# Patient Record
Sex: FEMALE | Race: WHITE | Employment: STUDENT | URBAN - METROPOLITAN AREA
[De-identification: names, ages, dates, MRNs, and addresses within clinical notes are randomized per-mention and may not be internally consistent; named-entity substitution may affect disease eponyms.]

---

## 2022-08-17 ENCOUNTER — OFFICE VISIT (OUTPATIENT)
Dept: FAMILY MEDICINE CLINIC | Age: 22
End: 2022-08-17
Payer: COMMERCIAL

## 2022-08-17 VITALS
WEIGHT: 148 LBS | HEIGHT: 67 IN | BODY MASS INDEX: 23.23 KG/M2 | DIASTOLIC BLOOD PRESSURE: 62 MMHG | OXYGEN SATURATION: 99 % | TEMPERATURE: 97.1 F | SYSTOLIC BLOOD PRESSURE: 102 MMHG | HEART RATE: 62 BPM

## 2022-08-17 DIAGNOSIS — N94.6 DYSMENORRHEA: ICD-10-CM

## 2022-08-17 DIAGNOSIS — J30.9 CHRONIC ALLERGIC RHINITIS: Primary | ICD-10-CM

## 2022-08-17 DIAGNOSIS — J45.30 MILD PERSISTENT ALLERGIC ASTHMA: ICD-10-CM

## 2022-08-17 PROCEDURE — 99203 OFFICE O/P NEW LOW 30 MIN: CPT | Performed by: FAMILY MEDICINE

## 2022-08-17 RX ORDER — OLOPATADINE HYDROCHLORIDE 1 MG/ML
1 SOLUTION/ DROPS OPHTHALMIC 2 TIMES DAILY
COMMUNITY

## 2022-08-17 RX ORDER — MONTELUKAST SODIUM 10 MG/1
10 TABLET ORAL DAILY
Qty: 90 TABLET | Refills: 1 | Status: SHIPPED | OUTPATIENT
Start: 2022-08-17

## 2022-08-17 RX ORDER — FLUTICASONE PROPIONATE 44 UG/1
1 AEROSOL, METERED RESPIRATORY (INHALATION) 2 TIMES DAILY
COMMUNITY
End: 2022-08-17 | Stop reason: SDUPTHER

## 2022-08-17 RX ORDER — FLUTICASONE PROPIONATE 44 UG/1
1 AEROSOL, METERED RESPIRATORY (INHALATION) 2 TIMES DAILY
Qty: 10.6 G | Refills: 2 | Status: SHIPPED | OUTPATIENT
Start: 2022-08-17

## 2022-08-17 RX ORDER — LEVALBUTEROL TARTRATE 45 UG/1
1 AEROSOL, METERED ORAL EVERY 4 HOURS PRN
Qty: 15 G | Refills: 3 | Status: SHIPPED | OUTPATIENT
Start: 2022-08-17 | End: 2022-08-19 | Stop reason: SDUPTHER

## 2022-08-17 SDOH — ECONOMIC STABILITY: FOOD INSECURITY: WITHIN THE PAST 12 MONTHS, YOU WORRIED THAT YOUR FOOD WOULD RUN OUT BEFORE YOU GOT MONEY TO BUY MORE.: NEVER TRUE

## 2022-08-17 SDOH — ECONOMIC STABILITY: FOOD INSECURITY: WITHIN THE PAST 12 MONTHS, THE FOOD YOU BOUGHT JUST DIDN'T LAST AND YOU DIDN'T HAVE MONEY TO GET MORE.: NEVER TRUE

## 2022-08-17 ASSESSMENT — PATIENT HEALTH QUESTIONNAIRE - PHQ9
2. FEELING DOWN, DEPRESSED OR HOPELESS: 0
SUM OF ALL RESPONSES TO PHQ QUESTIONS 1-9: 0
1. LITTLE INTEREST OR PLEASURE IN DOING THINGS: 0
SUM OF ALL RESPONSES TO PHQ9 QUESTIONS 1 & 2: 0
SUM OF ALL RESPONSES TO PHQ QUESTIONS 1-9: 0

## 2022-08-17 ASSESSMENT — SOCIAL DETERMINANTS OF HEALTH (SDOH): HOW HARD IS IT FOR YOU TO PAY FOR THE VERY BASICS LIKE FOOD, HOUSING, MEDICAL CARE, AND HEATING?: NOT HARD AT ALL

## 2022-08-17 NOTE — PROGRESS NOTES
Portions of this chart may have been created with voice recognition software. Occasional wrong-word or \"sound-alike\" substitutions may have occurred due to the inherent limitations of voice recognition software. Read the chart carefully and recognize, using context, where these substitutions have occurred        Chief Complaint     New Patient (Est. Severe menstrual cramps, referral to allergy)               Nidia Cruz is a 25 y.o. female here for establishing care with me. 1. Chronic allergic rhinitis    2. Mild persistent allergic asthma    Patient has a history of chronic allergic rhinitis, allergic asthma, was under the care of pediatric allergist however has not followed up in the long time. She has been on Flovent in the past which used to control her symptoms better. She requests refill for the same. She has had allergy shots in the past as well but over the years she had stopped taking the allergy shots. She is also taking Singulair just on an intermittent basis now especially in the summer. Also uses Pataday as well. She also used to take albuterol in the past but leading to palpitations it was switched to Xopenex and that she takes as needed. We will refill all the medications for now. Patient will hold off on allergy referral for now and we will refer if needed. No current shortness of breath wheezing excessive cough allergy symptoms at this time. 3. Dysmenorrhea  Patient states that her menstrual cycles are very painful and she has tried over-the-counter supplements with no help. She has not tried naproxen recommended to take 1 to 2 capsules of Aleve twice daily as needed for her menstrual cramps symptoms. Additional supplements discussed. Patient does not want to be on any hormonal treatment for this. Menstrual cycles are not very heavy. They are regular however. No other symptoms associated with this.             REVIEW OF SYSTEMS:  CONSTITUTIONAL: No weight loss, fever, weakness or fatigue. HEENT:No sore throat, runny nose, earache. No vision or hearing disturbances   CARDIOVASCULAR: No chest pain,No palpitations or edema. RESPIRATORY : No shortness of breath, cough. GASTROINTESTINAL: No nausea, vomiting, diarrhea or constipation. No abdominal pain. GENITOURINARY:No dysuria, urgency, frequency, hematuria. NEUROLOGICAL: No headache, dizziness or syncope. MUSCULOSKELETAL: No muscle, back pain, joint pain or stiffness. PSYCHIATRIC : No mood changes  SKIN: No rash or itching. No results found for: WBC, HGB, HCT, MCV, PLT   No results found for: LABA1C  No results found for: EAG  No results found for: TSHFT4, TSH  No results found for: CHOL  No results found for: TRIG  No results found for: HDL  No results found for: LDLCHOLESTEROL, LDLCALC  No results found for: LABVLDL, VLDL  No results found for: CHOLHDLRATIO   No results found for: NA, K, CL, CO2, BUN, CREATININE, GLUCOSE, CALCIUM, PROT, LABALBU, BILITOT, ALKPHOS, AST, ALT, LABGLOM, GFRAA, AGRATIO, GLOB        Current Outpatient Medications   Medication Sig Dispense Refill    Fexofenadine HCl (ALLEGRA PO) Take by mouth      fluticasone (FLOVENT HFA) 44 MCG/ACT inhaler Inhale 1 puff into the lungs 2 times daily 10.6 g 2    levalbuterol (XOPENEX HFA) 45 MCG/ACT inhaler Inhale 1 puff into the lungs every 4 hours as needed for Wheezing 15 g 3    montelukast (SINGULAIR) 10 MG tablet Take 1 tablet by mouth daily 90 tablet 1    olopatadine (PATANOL) 0.1 % ophthalmic solution 1 drop 2 times daily       No current facility-administered medications for this visit. No Known Allergies      History reviewed. No pertinent past medical history. History reviewed. No pertinent surgical history. History reviewed. No pertinent family history.      Social History     Socioeconomic History    Marital status: Single     Spouse name: None    Number of children: None    Years of education: None    Highest education level: None   Tobacco Use    Smoking status: Never    Smokeless tobacco: Never     Social Determinants of Health     Financial Resource Strain: Low Risk     Difficulty of Paying Living Expenses: Not hard at all   Food Insecurity: No Food Insecurity    Worried About [address] in the Last Year: Never true    [facility] in the Last Year: Never true          OBJECTIVE:      Vitals:    08/17/22 1331   BP: 102/62   Pulse: 62   Temp: 97.1 °F (36.2 °C)   SpO2: 99%   Weight: 148 lb (67.1 kg)   Height: 5' 7\" (1.702 m)         Constitutional: Patient appears well-nourished, not in any distress. HENT:  Head: Normocephalic and atraumatic. Eyes: Conjunctivae and EOM are normal  Right Ear: External ear normal.  Left Ear: External ear normal.   Nose: Nose normal.  Mouth/Throat: Oropharynx is clear and moist.   Neck: Normal range of motion. Neck supple. Lymphatic: No cervical lymphadenopathy  Cardiovascular: Normal rate, regular rhythm, normal heart sounds and intact distal pulses. Pulmonary/Chest: Effort normal and breath sounds normal, no wheezes or rhonchi. Neurological:Patient is alert, oriented to person, place, and time. No obvious focal neurological deficits  Skin: Skin is warm and moist.  Psychiatric:Patient has a normal mood and affect, behavior is normal. Judgment and thought content normal.    ASSESSMENT AND PLAN    Rubén Goodson was seen today for new patient. Diagnoses and all orders for this visit:    Chronic allergic rhinitis    Mild persistent allergic asthma    Dysmenorrhea    Other orders  -     fluticasone (FLOVENT HFA) 44 MCG/ACT inhaler; Inhale 1 puff into the lungs 2 times daily  -     levalbuterol (XOPENEX HFA) 45 MCG/ACT inhaler; Inhale 1 puff into the lungs every 4 hours as needed for Wheezing  -     montelukast (SINGULAIR) 10 MG tablet;  Take 1 tablet by mouth daily           DISCHARGE MEDICATION LIST        Medication List            Accurate as of August 17, 2022  1:53 PM. If you have any questions, ask your nurse or doctor. START taking these medications      levalbuterol 45 MCG/ACT inhaler  Commonly known as: Xopenex HFA  Inhale 1 puff into the lungs every 4 hours as needed for Wheezing  Started by: Naa Robbins MD     montelukast 10 MG tablet  Commonly known as: SINGULAIR  Take 1 tablet by mouth daily  Started by: Naa Robbins MD            CONTINUE taking these medications      ALLEGRA PO     fluticasone 44 MCG/ACT inhaler  Commonly known as: FLOVENT HFA  Inhale 1 puff into the lungs 2 times daily     olopatadine 0.1 % ophthalmic solution  Commonly known as: PATANOL               Where to Get Your Medications        These medications were sent to [address] [phone] - f [phone]  [place] [address]      Phone: [phone]   fluticasone 44 MCG/ACT inhaler  levalbuterol 45 MCG/ACT inhaler  montelukast 10 MG tablet          Return in about 6 months (around 2/17/2023), or if symptoms worsen or fail to improve. Please refer to diagnosis, orders and patient instructions for further recommendations given. All patient's questions and concerns were addressed appropriately. All orders, handouts were reviewed in detail with the patient and patient voiced understanding verbally.

## 2022-08-19 RX ORDER — LEVALBUTEROL TARTRATE 45 UG/1
1 AEROSOL, METERED ORAL EVERY 4 HOURS PRN
Qty: 15 G | Refills: 3 | Status: SHIPPED | OUTPATIENT
Start: 2022-08-19 | End: 2023-08-19

## 2022-12-30 RX ORDER — MONTELUKAST SODIUM 10 MG/1
10 TABLET ORAL DAILY
Qty: 180 TABLET | Refills: 0 | Status: SHIPPED | OUTPATIENT
Start: 2022-12-30 | End: 2023-06-28

## 2022-12-30 RX ORDER — FLUTICASONE PROPIONATE 44 UG/1
1 AEROSOL, METERED RESPIRATORY (INHALATION) 2 TIMES DAILY
Qty: 6 EACH | Refills: 2 | Status: SHIPPED | OUTPATIENT
Start: 2022-12-30

## 2022-12-30 RX ORDER — LEVALBUTEROL TARTRATE 45 UG/1
1 AEROSOL, METERED ORAL EVERY 4 HOURS PRN
Qty: 5 EACH | Refills: 0 | Status: SHIPPED | OUTPATIENT
Start: 2022-12-30 | End: 2023-12-30

## 2023-01-05 RX ORDER — FLUTICASONE PROPIONATE 44 UG/1
1 AEROSOL, METERED RESPIRATORY (INHALATION) 2 TIMES DAILY
Qty: 6 EACH | Refills: 2 | OUTPATIENT
Start: 2023-01-05

## 2023-01-05 RX ORDER — LEVALBUTEROL TARTRATE 45 UG/1
1 AEROSOL, METERED ORAL EVERY 4 HOURS PRN
Qty: 5 EACH | Refills: 0 | OUTPATIENT
Start: 2023-01-05 | End: 2024-01-05

## 2023-01-05 RX ORDER — MONTELUKAST SODIUM 10 MG/1
10 TABLET ORAL DAILY
Qty: 180 TABLET | Refills: 0 | OUTPATIENT
Start: 2023-01-05 | End: 2023-07-04

## 2023-09-05 RX ORDER — FLUTICASONE PROPIONATE 44 UG/1
1 AEROSOL, METERED RESPIRATORY (INHALATION) 2 TIMES DAILY
Qty: 6 EACH | Refills: 2 | OUTPATIENT
Start: 2023-09-05

## 2023-09-05 NOTE — TELEPHONE ENCOUNTER
Requested Prescriptions     Pending Prescriptions Disp Refills    fluticasone (FLOVENT HFA) 44 MCG/ACT inhaler 6 each 2     Sig: Inhale 1 puff into the lungs 2 times daily          Last Office Visit: 8/17/2022    Next Office Visit: Visit date not found

## 2023-09-07 ENCOUNTER — TELEPHONE (OUTPATIENT)
Dept: ORTHOPEDIC SURGERY | Age: 23
End: 2023-09-07

## 2023-09-07 NOTE — TELEPHONE ENCOUNTER
[facility] sent a PA form with additional questions that were answered and faxed to Fouzia Goodman.

## 2023-09-07 NOTE — TELEPHONE ENCOUNTER
Submitted PA for Fluticasone Propionate HFA 44MCG/ACT aerosol  Via CMM Key: [id number] STATUS: PENDING. Follow up done daily; if no response in three days we will refax for status check. If another three days goes by with no response we will call the insurance for status.

## 2023-09-08 NOTE — TELEPHONE ENCOUNTER
Received DENIAL for Fluticasone Propionate HFA 44MCG/ACT aerosol; denial letter attached. If this requires a response please respond to the pool ( [phone]). Thank you please advise patient.

## 2025-07-11 ENCOUNTER — TELEPHONE (OUTPATIENT)
Dept: INTERNAL MEDICINE CLINIC | Age: 25
End: 2025-07-11

## 2025-07-11 NOTE — TELEPHONE ENCOUNTER
----- Message from DELVIN GOLDSMITH MA sent at 7/11/2025  2:08 PM EDT -----  Regarding: FW: [facility] Appointment Request    ----- Message -----  From: Oliver Crocker  Sent: 7/11/2025   2:01 PM EDT  To: [facility]  Subject: [facility] Appointment Request                          [facility] Appointment Request    Patient needs appointment for [facility] Appointment Type: New Patient.    Patient Requested Dates(s): Any sooner day in this month   Patient Requested Time: anytime   Provider Name: Jaleel Barroso MD    Reason for Appointment Request: New Patient - No appointments available during search  --------------------------------------------------------------------------------------------------------------------------    Relationship to Patient: Self     Call Back Information: OK to leave message on voicemail  Preferred Call Back Number:  [phone]

## 2025-07-14 ENCOUNTER — OFFICE VISIT (OUTPATIENT)
Dept: PRIMARY CARE CLINIC | Age: 25
End: 2025-07-14
Payer: COMMERCIAL

## 2025-07-14 VITALS
TEMPERATURE: 97.4 F | SYSTOLIC BLOOD PRESSURE: 98 MMHG | WEIGHT: 151 LBS | DIASTOLIC BLOOD PRESSURE: 60 MMHG | HEART RATE: 61 BPM | HEIGHT: 67 IN | OXYGEN SATURATION: 98 % | BODY MASS INDEX: 23.7 KG/M2

## 2025-07-14 DIAGNOSIS — Z00.00 ANNUAL PHYSICAL EXAM: Primary | ICD-10-CM

## 2025-07-14 PROBLEM — E28.2 PCOS (POLYCYSTIC OVARIAN SYNDROME): Status: ACTIVE | Noted: 2025-07-14

## 2025-07-14 PROBLEM — N80.9 ENDOMETRIOSIS DETERMINED BY LAPAROSCOPY: Status: ACTIVE | Noted: 2025-07-14

## 2025-07-14 PROCEDURE — 99395 PREV VISIT EST AGE 18-39: CPT

## 2025-07-14 SDOH — ECONOMIC STABILITY: FOOD INSECURITY: WITHIN THE PAST 12 MONTHS, THE FOOD YOU BOUGHT JUST DIDN'T LAST AND YOU DIDN'T HAVE MONEY TO GET MORE.: NEVER TRUE

## 2025-07-14 SDOH — ECONOMIC STABILITY: FOOD INSECURITY: WITHIN THE PAST 12 MONTHS, YOU WORRIED THAT YOUR FOOD WOULD RUN OUT BEFORE YOU GOT MONEY TO BUY MORE.: NEVER TRUE

## 2025-07-14 SDOH — HEALTH STABILITY: PHYSICAL HEALTH: ON AVERAGE, HOW MANY MINUTES DO YOU ENGAGE IN EXERCISE AT THIS LEVEL?: 60 MIN

## 2025-07-14 SDOH — HEALTH STABILITY: PHYSICAL HEALTH: ON AVERAGE, HOW MANY DAYS PER WEEK DO YOU ENGAGE IN MODERATE TO STRENUOUS EXERCISE (LIKE A BRISK WALK)?: 4 DAYS

## 2025-07-14 ASSESSMENT — PATIENT HEALTH QUESTIONNAIRE - PHQ9
SUM OF ALL RESPONSES TO PHQ QUESTIONS 1-9: 0
1. LITTLE INTEREST OR PLEASURE IN DOING THINGS: NOT AT ALL
SUM OF ALL RESPONSES TO PHQ QUESTIONS 1-9: 0
SUM OF ALL RESPONSES TO PHQ QUESTIONS 1-9: 0
2. FEELING DOWN, DEPRESSED OR HOPELESS: NOT AT ALL
SUM OF ALL RESPONSES TO PHQ QUESTIONS 1-9: 0

## 2025-07-14 NOTE — PROGRESS NOTES
back: Neck supple.      Right lower leg: No edema.      Left lower leg: No edema.   Lymphadenopathy:      Cervical: No cervical adenopathy.   Skin:     General: Skin is warm and dry.      Findings: No rash.   Neurological:      General: No focal deficit present.      Mental Status: She is alert. Mental status is at baseline.      Motor: No weakness.   Psychiatric:         Mood and Affect: Mood normal.         Behavior: Behavior normal.         Thought Content: Thought content normal.         Judgment: Judgment normal.         Immunization History   Administered Date(s) Administered    COVID-19, J&J, (age 18y+), IM, 0.5 mL 09/04/2021, 01/04/2022    DTaP vaccine 2002, 02/17/2003, 04/22/2003, 07/19/2004    Hep B, ENGERIX-B, RECOMBIVAX-HB, (age Birth - 19y), IM, 0.5mL 2002, 2002, 08/05/2003    Hib (HbOC) 2002, 04/22/2003, 03/15/2004    MMR, PRIORIX, M-M-R II, (age 12m+), SC, 0.5mL 03/15/2004    Pneumococcal Conjugate 7-valent (Prevnar7) 02/17/2003, 04/22/2003, 10/21/2003    Poliovirus, IPOL, (age 6w+), SC/IM, 0.5mL 2002, 02/17/2003, 10/21/2003    Varicella, VARIVAX, (age 12m+), SC, 0.5mL 07/19/2004       Health Maintenance   Topic Date Due    Polio vaccine (4 of 4 - 4-dose series) 10/16/2006    Varicella vaccine (2 of 2 - 2-dose childhood series) 10/16/2006    DTaP/Tdap/Td vaccine (5 - Tdap) 10/16/2013    Depression Screen  Never done    HPV vaccine (1 - 3-dose series) Never done    HIV screen  Never done    Meningococcal B vaccine (1 of 2 - Standard) Never done    Chlamydia/GC screen  Never done    Hepatitis C screen  Never done    Pap smear  Never done    COVID-19 Vaccine (3 - 2024-25 season) 09/01/2024    Flu vaccine (1) 08/01/2025    Hepatitis B vaccine  Completed    Hib vaccine  Completed    Hepatitis A vaccine  Aged Out    Meningococcal (ACWY) vaccine  Aged Out    Pneumococcal 0-49 years Vaccine  Aged Out       PSH, PMH, SH and FH reviewed and noted.  Recent and past labs, tests and